# Patient Record
Sex: FEMALE | Race: WHITE | NOT HISPANIC OR LATINO | URBAN - METROPOLITAN AREA
[De-identification: names, ages, dates, MRNs, and addresses within clinical notes are randomized per-mention and may not be internally consistent; named-entity substitution may affect disease eponyms.]

---

## 2023-10-05 ENCOUNTER — OFFICE VISIT (OUTPATIENT)
Dept: URGENT CARE | Facility: MEDICAL CENTER | Age: 33
End: 2023-10-05
Payer: COMMERCIAL

## 2023-10-05 VITALS
RESPIRATION RATE: 18 BRPM | WEIGHT: 207 LBS | OXYGEN SATURATION: 98 % | DIASTOLIC BLOOD PRESSURE: 83 MMHG | SYSTOLIC BLOOD PRESSURE: 139 MMHG | HEIGHT: 65 IN | TEMPERATURE: 98.2 F | HEART RATE: 104 BPM | BODY MASS INDEX: 34.49 KG/M2

## 2023-10-05 DIAGNOSIS — J06.9 VIRAL UPPER RESPIRATORY TRACT INFECTION: Primary | ICD-10-CM

## 2023-10-05 PROCEDURE — 99213 OFFICE O/P EST LOW 20 MIN: CPT | Performed by: PHYSICIAN ASSISTANT

## 2023-10-05 RX ORDER — PANTOPRAZOLE SODIUM 40 MG/1
TABLET, DELAYED RELEASE ORAL
COMMUNITY
Start: 2023-09-06

## 2023-10-05 RX ORDER — ATORVASTATIN CALCIUM 10 MG/1
TABLET, FILM COATED ORAL
COMMUNITY
Start: 2023-09-05

## 2023-10-05 RX ORDER — TOPIRAMATE 50 MG/1
TABLET, FILM COATED ORAL
COMMUNITY
Start: 2023-09-06

## 2023-10-05 RX ORDER — METFORMIN HYDROCHLORIDE 750 MG/1
TABLET, EXTENDED RELEASE ORAL
COMMUNITY
Start: 2023-09-06

## 2023-10-05 RX ORDER — BUPROPION HYDROCHLORIDE 150 MG/1
TABLET, EXTENDED RELEASE ORAL
COMMUNITY
Start: 2023-09-06

## 2023-10-05 NOTE — PROGRESS NOTES
[facility]        NAME: Berhane Borden is a 29 y.o. female  : 1990    MRN: [id number]  DATE: 2023  TIME: 7:21 PM    Assessment and Plan   Viral upper respiratory tract infection [J06.9]  1. Viral upper respiratory tract infection            Refusing covid swab. Patient Instructions   Patient Instructions   Follow-up with your primary care provider in the next 3-5 days. Any new or worsening symptoms develop get re-evaluated sooner or proceed to the ER. Follow up with PCP in 3-5 days. Proceed to  ER if symptoms worsen. Chief Complaint     Chief Complaint   Patient presents with   • Cold Like Symptoms     Pt. With cough, congestion, sneezing that began 4 days ago. Had a fever the second day of illness. History of Present Illness       Patient presents with 4 days of cough, congestion, runny nose. Runny nose and congestion resolved now just with a lingering cough. Had a fever at the start of symptoms. Denies chest pains, SOB, dyspnea, fevers, or sick contacts. Review of Systems   Review of Systems   Constitutional: Negative for chills, fatigue and fever. HENT: Positive for congestion and sneezing. Negative for ear discharge, ear pain, postnasal drip, rhinorrhea, sinus pressure, sinus pain and sore throat. Respiratory: Positive for cough. Negative for chest tightness, shortness of breath and wheezing. Cardiovascular: Negative for chest pain and palpitations. Musculoskeletal: Negative for arthralgias and myalgias. Neurological: Negative for weakness. Psychiatric/Behavioral: Negative for confusion.          Current Medications       Current Outpatient Medications:   •  atorvastatin (LIPITOR) 10 mg tablet, , Disp: , Rfl:   •  buPROPion (WELLBUTRIN SR) 150 mg 12 hr tablet, , Disp: , Rfl:   •  metFORMIN (GLUCOPHAGE-XR) 750 mg 24 hr tablet, , Disp: , Rfl:   •  pantoprazole (PROTONIX) 40 mg tablet, , Disp: , Rfl:   •  topiramate (TOPAMAX) 50 MG tablet, , Disp: , Rfl:     Current Allergies     Allergies as of 10/05/2023   • (No Known Allergies)            The following portions of the patient's history were reviewed and updated as appropriate: allergies, current medications, past family history, past medical history, past social history, past surgical history and problem list.     Past Medical History:   Diagnosis Date   • Diabetes mellitus ([address])    • Hyperlipidemia        No past surgical history on file. No family history on file. Medications have been verified. Objective   /83   Pulse 104   Temp 98.2 °F (36.8 °C)   Resp 18   Ht 5' 5" (1.651 m)   Wt 93.9 kg (207 lb)   SpO2 98%   BMI 34.45 kg/m²        Physical Exam     Physical Exam  Constitutional:       General: She is not in acute distress. Appearance: Normal appearance. She is not ill-appearing or diaphoretic. HENT:      Right Ear: Tympanic membrane, ear canal and external ear normal.      Left Ear: Tympanic membrane, ear canal and external ear normal.      Nose: Nose normal.      Mouth/Throat:      Mouth: Mucous membranes are moist.      Pharynx: Oropharynx is clear. Eyes:      Conjunctiva/sclera: Conjunctivae normal.   Cardiovascular:      Rate and Rhythm: Normal rate and regular rhythm. Heart sounds: Normal heart sounds. Pulmonary:      Effort: Pulmonary effort is normal.      Breath sounds: Normal breath sounds. Skin:     General: Skin is warm and dry. Neurological:      Mental Status: She is alert.    Psychiatric:         Mood and Affect: Mood normal.         Behavior: Behavior normal.

## 2023-11-22 NOTE — PROGRESS NOTES
Patient presents for a routine annual visit  Do history for periods**  Last Pap Smear- 2021 -/-  HPV vaccine- not completed   LMP-  Birth control- pill    *** Smoker  Currently sexually active? STD testing?   No family history of uterine, ovarian, cervical   MA(2) and GMA breast cancer  No concerns/questions for today's visit

## 2023-11-24 ENCOUNTER — ANNUAL EXAM (OUTPATIENT)
Dept: OBGYN CLINIC | Facility: CLINIC | Age: 33
End: 2023-11-24
Payer: COMMERCIAL

## 2023-11-24 VITALS
HEIGHT: 65 IN | WEIGHT: 214.2 LBS | BODY MASS INDEX: 35.69 KG/M2 | SYSTOLIC BLOOD PRESSURE: 128 MMHG | DIASTOLIC BLOOD PRESSURE: 78 MMHG

## 2023-11-24 DIAGNOSIS — N94.10 DYSPAREUNIA IN FEMALE: ICD-10-CM

## 2023-11-24 DIAGNOSIS — R10.31 RLQ ABDOMINAL PAIN: ICD-10-CM

## 2023-11-24 DIAGNOSIS — Z80.3 FAMILY HISTORY OF BREAST CANCER: ICD-10-CM

## 2023-11-24 DIAGNOSIS — Z12.31 SCREENING MAMMOGRAM FOR BREAST CANCER: ICD-10-CM

## 2023-11-24 DIAGNOSIS — Z01.419 WELL WOMAN EXAM WITH ROUTINE GYNECOLOGICAL EXAM: Primary | ICD-10-CM

## 2023-11-24 PROCEDURE — S0612 ANNUAL GYNECOLOGICAL EXAMINA: HCPCS | Performed by: PHYSICIAN ASSISTANT

## 2023-11-24 RX ORDER — METFORMIN HYDROCHLORIDE 750 MG/1
TABLET, EXTENDED RELEASE ORAL
COMMUNITY
Start: 2023-11-12

## 2023-11-24 RX ORDER — BUPROPION HYDROCHLORIDE 150 MG/1
150 TABLET, EXTENDED RELEASE ORAL 2 TIMES DAILY
COMMUNITY
Start: 2023-11-12

## 2023-11-24 NOTE — PROGRESS NOTES
Assessment   29 y.o.  presenting for annual exam.     Plan   Diagnoses and all orders for this visit:    Well woman exam with routine gynecological exam    Other orders  -     buPROPion (WELLBUTRIN SR) 150 mg 12 hr tablet; Take 150 mg by mouth 2 (two) times a day  -     metFORMIN (GLUCOPHAGE-XR) 750 mg 24 hr tablet; TAKE 1 TABLET BY MOUTH EVERY DAY EVENING MEAL        Pap up to date  RLQ pain/dyspareunia- advised to complete TVUS; order placed  Fhx of breast cancer- two maternal aunts/MGGM with breast cancer; one aunt is gene +; MGM with ovarian cancer. Pt has no interest in genetic onc referral. States she would rather not know her gene status. She is interested in early mammography screening. Order given today. Advised to contact insurance company to determine if covered    Perineal hygiene reviewed. Weight bearing exercises minium of 150 mins/weekly advised. Kegel exercises recommended. SBE encouraged, A yearly mammogram is recommended for breast cancer screening starting at age 36. ASCCP guidelines reviewed. Condoms encouraged with all sexual activity to prevent STI's. Gardisil vaccines recommended up to age 39. Calcium/ Vit D dietary requirements discussed. Advised to call with any issues, all concerns & questions addressed. See provided information in your after visit summary     F/U Annually and PRN    Results will be released to [employer], if abnormal will call or message to review and discuss treatment plan.     __________________________________________________________________    Subjective     Timi Dang is a 29 y.o.  presenting for annual exam. She is without complaint and does not want STD testing today. Reports some random twinges of RLQ pain at rest randomly. Episodes are short and resolve spontaneously. Has noticed more frequent RLQ pain with sex though. This is new for her.     SCREENING  Last Pap: 2021 neg/neg      GYN  Periods are regular every 25-35 days, lasting  3-5 days.  Hx of PCOS so cycles have always varied somewhat. Knows to call if ever going >90 days without menses    Sexually active: Yes - single partner - male  Contraception: vasectomy    Hx Abnormal pap: hx of abnormal pap in - did not require colpo; follow up paps normal  We reviewed ASCCP guidelines for Pap testing today. Denies vaginal discharge, itching, odor, dyspareunia, pelvic pain and vulvar/vaginal symptoms      OB           Complaints: denies   Denies urgency, frequency, hematuria, leakage / change in stream, difficulty urinating. BREAST  Complaints: denies   Denies: breast lump, breast tenderness, nipple discharge, skin color change, and skin lesion(s)  Personal hx: none      Pertinent Family Hx:   Family hx of breast cancer: maternal aunt x 2 (one aunt had BC in 25s and again in 46s- did not have gene testing); (other aunt diagnosed in her 46s and gene positive); [address]    Patient and mom are NOT interested in gene testing  Her sister is interested but pt does not believe she has completed gene testing yet    Family hx of ovarian cancer: MGM  Family hx of colon cancer: no      GENERAL  PMH reviewed/updated and is as below. Patient does follow with a PCP.     SOCIAL  Smoking: no  Alcohol:social  Drug: no  Occupation: [employer]       Past Medical History:   Diagnosis Date    Depression     Migraine     Polycystic ovarian syndrome     Varicella        Past Surgical History:   Procedure Laterality Date    TONSILLECTOMY           Current Outpatient Medications:     atorvastatin (LIPITOR) 10 mg tablet, , Disp: , Rfl:     buPROPion (WELLBUTRIN SR) 150 mg 12 hr tablet, Take 150 mg by mouth 2 (two) times a day, Disp: , Rfl:     metFORMIN (GLUCOPHAGE-XR) 750 mg 24 hr tablet, TAKE 1 TABLET BY MOUTH EVERY DAY EVENING MEAL, Disp: , Rfl:     pantoprazole (PROTONIX) 40 mg tablet, , Disp: , Rfl:     topiramate (TOPAMAX) 50 MG tablet, Take 50 mg by mouth 2 (two) times a day, Disp: , Rfl: buPROPion (WELLBUTRIN XL) 150 mg 24 hr tablet, , Disp: , Rfl:     buPROPion (WELLBUTRIN XL) 300 mg 24 hr tablet, Take 300 mg by mouth daily (Patient not taking: Reported on 11/24/2023), Disp: , Rfl:     levonorgestrel-ethinyl estradiol (NORDETTE) 0.15-30 MG-MCG per tablet, Take 1 tablet by mouth daily (Patient not taking: Reported on 7/26/2022), Disp: 90 tablet, Rfl: 3    Linzess 67 MCG CAPS, TAKE 1 CAPSULE BY MOUTH EVERY DAY ON EMPTY STOMACH AT LEAST 30 MINUTES BEFORE FIRST MEAL OF THE DAY (Patient not taking: Reported on 7/26/2022), Disp: , Rfl:     metFORMIN (GLUCOPHAGE-XR) 500 mg 24 hr tablet, , Disp: , Rfl:     No Known Allergies    Social History     Socioeconomic History    Marital status: Single     Spouse name: Not on file    Number of children: Not on file    Years of education: Not on file    Highest education level: Not on file   Occupational History    Not on file   Tobacco Use    Smoking status: Former    Smokeless tobacco: Current   Vaping Use    Vaping Use: Never used   Substance and Sexual Activity    Alcohol use: Yes     Comment: social    Drug use: Never    Sexual activity: Yes     Partners: Male     Birth control/protection: Male Sterilization   Other Topics Concern    Not on file   Social History Narrative    Not on file     Social Determinants of Health     Financial Resource Strain: Not on file   Food Insecurity: Not on file   Transportation Needs: Not on file   Physical Activity: Not on file   Stress: Not on file   Social Connections: Not on file   Intimate Partner Violence: Not on file   Housing Stability: Not on file       Review of Systems     ROS:  Constitutional: Negative for fatigue and unexpected weight change. Respiratory: Negative for cough and shortness of breath. Cardiovascular: Negative for chest pain and palpitations. Gastrointestinal: Negative for abdominal pain and change in bowel habits  Breasts:  Negative, other than as noted above.    Genitourinary: Negative, other than as noted above. Psychiatric: Negative for mood difficulties. Objective        Vitals:    11/24/23 1345   BP: 128/78       Physical Examination:    Patient appears well and is not in distress  Neck is supple without masses, no cervical or supraclavicular lymphadenopathy  Cardiovascular: regular rate and rhythm; no murmurs  Lungs: clear to auscultation bilaterally; no wheezes  Breasts are symmetrical without mass, tenderness, nipple discharge, skin changes or adenopathy. Abdomen is soft and nontender without masses. External genitals are normal without lesions or rashes. Urethral meatus and urethra are normal  Bladder is normal to palpation  Vagina is normal without discharge or bleeding. Cervix is normal without discharge or lesion. Uterus is normal, mobile, nontender without palpable mass. Adnexa are normal, nontender, without palpable mass.

## 2023-12-18 ENCOUNTER — HOSPITAL ENCOUNTER (OUTPATIENT)
Dept: ULTRASOUND IMAGING | Facility: HOSPITAL | Age: 33
Discharge: HOME | End: 2023-12-18
Payer: COMMERCIAL

## 2023-12-18 DIAGNOSIS — N94.10 DYSPAREUNIA IN FEMALE: ICD-10-CM

## 2023-12-18 DIAGNOSIS — R10.31 RLQ ABDOMINAL PAIN: ICD-10-CM

## 2023-12-18 PROCEDURE — 76856 US EXAM PELVIC COMPLETE: CPT

## 2023-12-18 PROCEDURE — 76830 TRANSVAGINAL US NON-OB: CPT

## 2024-02-21 PROBLEM — Z01.419 ENCOUNTER FOR GYNECOLOGICAL EXAMINATION (GENERAL) (ROUTINE) WITHOUT ABNORMAL FINDINGS: Status: RESOLVED | Noted: 2021-12-08 | Resolved: 2024-02-21

## 2025-06-02 ENCOUNTER — OFFICE VISIT (OUTPATIENT)
Dept: PODIATRY | Facility: CLINIC | Age: 35
End: 2025-06-02
Payer: COMMERCIAL

## 2025-06-02 VITALS — OXYGEN SATURATION: 98 % | BODY MASS INDEX: 34.66 KG/M2 | WEIGHT: 208 LBS | HEART RATE: 96 BPM | HEIGHT: 65 IN

## 2025-06-02 DIAGNOSIS — B35.1 ONYCHOMYCOSIS: Primary | ICD-10-CM

## 2025-06-02 PROCEDURE — 99203 OFFICE O/P NEW LOW 30 MIN: CPT | Performed by: PODIATRIST

## 2025-06-02 RX ORDER — SEMAGLUTIDE 0.5 MG/.5ML
0.5 INJECTION, SOLUTION SUBCUTANEOUS WEEKLY
COMMUNITY
Start: 2025-05-22

## 2025-06-02 NOTE — PROGRESS NOTES
:  Assessment & Plan  Onychomycosis    Orders:    Hepatic function panel; Future    The patient's clinical examination today is significant for thickening and discoloration of the bilateral hallucal nail plates and to lesser degree the bilateral third toenail plates concerning for mycosis.  The patient does have a history of ingrown nails to her bilateral great toenails, however today she is not having any discomfort.  The interdigital spaces are clear and without maceration.  There are no signs of any active tinea pedis to either foot.  Pedal pulses are palpable.    Treatment options were discussed with the patient.  She has tried some topical therapies in the past however have not not had any good results in terms of treating her fungal toenails.  She is interested in oral antifungal therapy due to its enhanced efficacy.  We did discuss potential side effects to include hepatotoxicity.  She notes no history of any hepatic disease or dysfunction.  She has not had any recent blood work but does note that she has pending labs for her primary care physician that she will be getting done soon.  I did add a hepatic function panel to her upcoming blood work.    When she has had her blood work done, we can review her LFTs.  If they are within normal limits, we will initiate a 90-day course of oral terbinafine.  I would recommend follow-up in about 3 months assuming that she start oral antifungal therapy.    History of Present Illness     Nia Del Real is a 35 y.o. female   The patient presents today for her initial consultation with [facility] with a chief complaint of discoloration and thickening of her pedal nail plates that has been present for several years now.  She does note some occasional ingrown nails, especially to her great toes.  She has tried some OTC topical remedies for management of onychomycosis, including tea tree oil, but has not had any success.      PAST MEDICAL HISTORY:  Past Medical  "History[1]    PAST SURGICAL HISTORY:  Past Surgical History[2]     ALLERGIES:  Patient has no known allergies.    MEDICATIONS:  Current Medications[3]    SOCIAL HISTORY:  Social History[4]   Review of Systems   Constitutional: Negative.    Respiratory: Negative.     Cardiovascular: Negative.    Skin: Negative.    Psychiatric/Behavioral: Negative.       Objective   Pulse 96   Ht 5' 5\" (1.651 m)   Wt 94.3 kg (208 lb)   SpO2 98%   BMI 34.61 kg/m²      Physical Exam  Vitals and nursing note reviewed.   Constitutional:       General: She is not in acute distress.     Appearance: She is well-developed.   HENT:      Head: Normocephalic and atraumatic.     Eyes:      Conjunctiva/sclera: Conjunctivae normal.       Cardiovascular:      Pulses:           Dorsalis pedis pulses are 2+ on the right side and 2+ on the left side.        Posterior tibial pulses are 2+ on the right side and 2+ on the left side.   Pulmonary:      Effort: Pulmonary effort is normal.   Abdominal:      Palpations: Abdomen is soft.   Feet:      Right foot:      Skin integrity: Skin integrity normal.      Toenail Condition: Right toenails are abnormally thick. Fungal disease present.     Left foot:      Skin integrity: Skin integrity normal.      Toenail Condition: Left toenails are abnormally thick. Fungal disease present.     Comments: The patient's clinical examination today is significant for thickening and discoloration of the bilateral hallucal nail plates and to lesser degree the bilateral third toenail plates concerning for mycosis.  The patient does have a history of ingrown nails to her bilateral great toenails, however today she is not having any discomfort.  The interdigital spaces are clear and without maceration.  There are no signs of any active tinea pedis to either foot.  Pedal pulses are palpable.    Skin:     General: Skin is warm and dry.      Capillary Refill: Capillary refill takes less than 2 seconds.     Neurological:      " General: No focal deficit present.      Mental Status: She is alert and oriented to person, place, and time.     Psychiatric:         Mood and Affect: Mood normal.                [1]   Past Medical History:  Diagnosis Date    Depression     Migraine     Polycystic ovarian syndrome     Varicella    [2]   Past Surgical History:  Procedure Laterality Date    TONSILLECTOMY     [3]   Current Outpatient Medications   Medication Sig Dispense Refill    atorvastatin (LIPITOR) 10 mg tablet       buPROPion (WELLBUTRIN SR) 150 mg 12 hr tablet Take 150 mg by mouth in the morning and 150 mg in the evening.      metFORMIN (GLUCOPHAGE-XR) 750 mg 24 hr tablet       pantoprazole (PROTONIX) 40 mg tablet       topiramate (TOPAMAX) 50 MG tablet Take 50 mg by mouth in the morning and 50 mg in the evening.      Wegovy 0.5 MG/0.5ML Inject 0.5 mg under the skin once a week       No current facility-administered medications for this visit.   [4]   Social History  Socioeconomic History    Marital status: Single   Tobacco Use    Smoking status: Former    Smokeless tobacco: Current   Vaping Use    Vaping status: Never Used   Substance and Sexual Activity    Alcohol use: Yes     Comment: social    Drug use: Never    Sexual activity: Yes     Partners: Male     Birth control/protection: Male Sterilization

## 2025-06-14 LAB
ALBUMIN SERPL-MCNC: 4.3 G/DL (ref 3.6–5.1)
ALBUMIN/GLOB SERPL: 2 (CALC) (ref 1–2.5)
ALP SERPL-CCNC: 80 U/L (ref 31–125)
ALT SERPL-CCNC: 26 U/L (ref 6–29)
AST SERPL-CCNC: 16 U/L (ref 10–30)
BILIRUB DIRECT SERPL-MCNC: 0.1 MG/DL
BILIRUB INDIRECT SERPL-MCNC: 0.4 MG/DL (CALC) (ref 0.2–1.2)
BILIRUB SERPL-MCNC: 0.5 MG/DL (ref 0.2–1.2)
GLOBULIN SER CALC-MCNC: 2.1 G/DL (CALC) (ref 1.9–3.7)
PROT SERPL-MCNC: 6.4 G/DL (ref 6.1–8.1)

## 2025-06-26 ENCOUNTER — TELEPHONE (OUTPATIENT)
Age: 35
End: 2025-06-26

## 2025-06-26 NOTE — TELEPHONE ENCOUNTER
I spoke with Pt. Dr No will review her bloodwork when he returns from vacation and put in a script at that time.

## 2025-06-26 NOTE — TELEPHONE ENCOUNTER
Caller: Nia Del Real    Doctor and/or Office: Dr. No/Guerrero    #: [phone]    Escalation: Last office visit Patient would like to know the results of her bloodwork and if she can start the medication as discussed in last office visit. She uses the pharmacy listed in [facility]. Thank you

## 2025-07-09 DIAGNOSIS — B35.1 ONYCHOMYCOSIS: Primary | ICD-10-CM

## 2025-07-09 RX ORDER — TERBINAFINE HYDROCHLORIDE 250 MG/1
250 TABLET ORAL DAILY
Qty: 30 TABLET | Refills: 2 | Status: SHIPPED | OUTPATIENT
Start: 2025-07-09 | End: 2025-10-07

## 2025-07-25 ENCOUNTER — ANNUAL EXAM (OUTPATIENT)
Dept: OBGYN CLINIC | Facility: CLINIC | Age: 35
End: 2025-07-25
Payer: COMMERCIAL

## 2025-07-25 VITALS
WEIGHT: 200 LBS | HEIGHT: 65 IN | DIASTOLIC BLOOD PRESSURE: 64 MMHG | SYSTOLIC BLOOD PRESSURE: 118 MMHG | BODY MASS INDEX: 33.32 KG/M2

## 2025-07-25 DIAGNOSIS — Z12.4 SCREENING FOR CERVICAL CANCER: ICD-10-CM

## 2025-07-25 DIAGNOSIS — Z80.3 FAMILY HISTORY OF BREAST CANCER: ICD-10-CM

## 2025-07-25 DIAGNOSIS — Z01.419 WELL WOMAN EXAM WITH ROUTINE GYNECOLOGICAL EXAM: Primary | ICD-10-CM

## 2025-07-25 PROCEDURE — G0145 SCR C/V CYTO,THINLAYER,RESCR: HCPCS | Performed by: PHYSICIAN ASSISTANT

## 2025-07-25 PROCEDURE — S0612 ANNUAL GYNECOLOGICAL EXAMINA: HCPCS | Performed by: PHYSICIAN ASSISTANT

## 2025-07-25 PROCEDURE — G0476 HPV COMBO ASSAY CA SCREEN: HCPCS | Performed by: PHYSICIAN ASSISTANT

## 2025-07-25 RX ORDER — MUPIROCIN 2 %
OINTMENT (GRAM) TOPICAL 2 TIMES DAILY
COMMUNITY
Start: 2025-07-15

## 2025-07-25 RX ORDER — ALBUTEROL SULFATE 90 UG/1
2 INHALANT RESPIRATORY (INHALATION) EVERY 4 HOURS PRN
COMMUNITY
Start: 2025-02-21

## 2025-07-25 NOTE — PROGRESS NOTES
Name: Nia Del Real      : 1990      MRN: [id number]  Encounter Provider: Regina Lizama PA-C  Encounter Date: 2025   Encounter department: [facility]  :  Assessment & Plan  Well woman exam with routine gynecological exam  Pap collected      Perineal hygiene reviewed. Weight bearing exercises minium of 150 mins/weekly advised. Kegel exercises recommended.   SBE encouraged, A yearly mammogram is recommended for breast cancer screening starting at age 40. ASCCP guidelines reviewed. Condoms encouraged with all sexual activity to prevent STI's. Gardisil vaccines recommended up to age 45. Calcium/ Vit D dietary requirements discussed.   Advised to call with any issues, all concerns & questions addressed.   See provided information in your after visit summary     F/U Annually and PRN    Results will be released to [employer], if abnormal will call or message to review and discuss treatment plan.        Family history of breast cancer  Mammo slip given due to fhx. Advised to call insurance prior to scheduling to determine coverage. Reviewed option for gene testing or referral to breast specialist which pt declines at this time          History of Present Illness   HPI    Subjective     Nia Del Real is a 35 y.o.  presenting for annual exam. She is without complaint and does not want STD testing today.     SCREENING  Last Pap: 2021 negative       GYN  The patient's menstrual history is as follows:    ;  ; Period Cycle (Days): 28; Period Duration (Days): 5; Period Pattern: Regular; Menstrual Flow: Moderate, Light; Dysmenorrhea: (!) Moderate; Dysmenorrhea Symptoms: Cramping    Sexually active: Yes - single partner - male  Contraception: vasectomy     Hx Abnormal pap:  hx of abnormal pap in - did not require colpo; follow up paps normal   We reviewed ASCCP guidelines for Pap testing today.    Denies vaginal discharge, itching, odor, dyspareunia, pelvic  pain and vulvar/vaginal symptoms      OB           Complaints: denies   Denies urgency, frequency, hematuria, leakage / change in stream, difficulty urinating.       BREAST  Complaints: denies   Denies: breast lump, breast tenderness, nipple discharge, skin color change, and skin lesion(s)      Pertinent Family Hx:   Family hx of breast cancer: maternal aunt x 2 (one aunt had BC in 20s and again in 50s- did not have gene testing); (other aunt diagnosed in her 50s and gene positive); MGGM     Pt and mom are against gene testing  Pt's sister is considering testing    Family hx of ovarian cancer: MGM  Family hx of colon cancer: no      GENERAL  PMH reviewed/updated and is as below.   Patient does follow with a PCP.    SOCIAL  Smoking: no  Alcohol:social  Drug: no  Occupation:[employer]       Past Medical History[1]    Past Surgical History[2]    Current Medications[3]    Allergies[4]    Social History     Socioeconomic History    Marital status: Single     Spouse name: Not on file    Number of children: Not on file    Years of education: Not on file    Highest education level: Not on file   Occupational History    Not on file   Tobacco Use    Smoking status: Former    Smokeless tobacco: Current   Vaping Use    Vaping status: Never Used   Substance and Sexual Activity    Alcohol use: Yes     Comment: social    Drug use: Never    Sexual activity: Yes     Partners: Male     Birth control/protection: Male Sterilization   Other Topics Concern    Not on file   Social History Narrative    Not on file     Social Drivers of Health     Financial Resource Strain: Not on file   Food Insecurity: Not on file   Transportation Needs: Not on file   Physical Activity: Not on file   Stress: Not on file   Social Connections: Not on file   Intimate Partner Violence: Not on file   Housing Stability: Not on file       Review of Systems   Constitutional: Negative.    Respiratory: Negative.     Cardiovascular: Negative.   "  Gastrointestinal: Negative.    Genitourinary: Negative.    Musculoskeletal: Negative.    Skin: Negative.    Psychiatric/Behavioral: Negative.            Objective   /64 (BP Location: Left arm, Patient Position: Sitting, Cuff Size: Standard)   Ht 5' 5\" (1.651 m)   Wt 90.7 kg (200 lb)   LMP 07/19/2025 (Approximate)   BMI 33.28 kg/m²      Physical Exam  Constitutional:       Appearance: Normal appearance.   Genitourinary:      Urethral meatus normal.      No lesions in the vagina.      Right Labia: No rash, tenderness, lesions or skin changes.     Left Labia: No tenderness, lesions, skin changes or rash.     No inguinal adenopathy present in the right or left side.     No vaginal discharge, tenderness or bleeding.      No vaginal prolapse present.       Right Adnexa: not tender, not full and no mass present.     Left Adnexa: not tender and not full.     No cervical motion tenderness, friability, lesion, polyp or eversion.      Uterus is not enlarged or tender.      No uterine mass detected.  Breasts:     Breasts are symmetrical.      Right: No mass, nipple discharge, skin change or tenderness.      Left: No mass, nipple discharge, skin change or tenderness.   HENT:      Head: Normocephalic and atraumatic.   Neck:      Thyroid: No thyroid mass or thyromegaly.   Pulmonary:      Effort: Pulmonary effort is normal.   Abdominal:      General: Abdomen is flat.      Hernia: There is no hernia in the left inguinal area or right inguinal area.     Musculoskeletal:      Cervical back: Neck supple.      Right lower leg: No edema.      Left lower leg: No edema.   Lymphadenopathy:      Cervical: No cervical adenopathy.      Upper Body:      Right upper body: No supraclavicular or axillary adenopathy.      Left upper body: No supraclavicular or axillary adenopathy.      Lower Body: No right inguinal adenopathy. No left inguinal adenopathy.     Neurological:      General: No focal deficit present.      Mental Status: She " is alert. Mental status is at baseline.     Skin:     General: Skin is warm and dry.     Psychiatric:         Mood and Affect: Mood normal.         Behavior: Behavior normal.         Thought Content: Thought content normal.         Judgment: Judgment normal.   Vitals reviewed.                [1]   Past Medical History:  Diagnosis Date    Depression     Migraine     Polycystic ovarian syndrome     Varicella    [2]   Past Surgical History:  Procedure Laterality Date    TONSILLECTOMY     [3]   Current Outpatient Medications:     albuterol (PROVENTIL HFA,VENTOLIN HFA) 90 mcg/act inhaler, Inhale 2 puffs every 4 (four) hours as needed, Disp: , Rfl:     atorvastatin (LIPITOR) 10 mg tablet, , Disp: , Rfl:     buPROPion (WELLBUTRIN SR) 150 mg 12 hr tablet, Take 150 mg by mouth in the morning and 150 mg in the evening., Disp: , Rfl:     metFORMIN (GLUCOPHAGE-XR) 750 mg 24 hr tablet, , Disp: , Rfl:     mupirocin (BACTROBAN) 2 % ointment, 2 (two) times a day for 14 days, Disp: , Rfl:     pantoprazole (PROTONIX) 40 mg tablet, , Disp: , Rfl:     terbinafine (LamISIL) 250 mg tablet, Take 1 tablet (250 mg total) by mouth daily, Disp: 30 tablet, Rfl: 2    topiramate (TOPAMAX) 50 MG tablet, Take 50 mg by mouth in the morning and 50 mg in the evening., Disp: , Rfl:     Wegovy 0.5 MG/0.5ML, Inject 0.5 mg under the skin once a week, Disp: , Rfl:   [4] No Known Allergies

## 2025-07-28 LAB
HPV HR 12 DNA CVX QL NAA+PROBE: NEGATIVE
HPV16 DNA CVX QL NAA+PROBE: NEGATIVE
HPV18 DNA CVX QL NAA+PROBE: NEGATIVE

## 2025-07-30 LAB
LAB AP GYN PRIMARY INTERPRETATION: NORMAL
Lab: NORMAL

## 2025-08-15 ENCOUNTER — NURSE TRIAGE (OUTPATIENT)
Age: 35
End: 2025-08-15